# Patient Record
Sex: FEMALE | Race: BLACK OR AFRICAN AMERICAN | URBAN - METROPOLITAN AREA
[De-identification: names, ages, dates, MRNs, and addresses within clinical notes are randomized per-mention and may not be internally consistent; named-entity substitution may affect disease eponyms.]

---

## 2021-01-18 ENCOUNTER — OFFICE VISIT (OUTPATIENT)
Dept: FAMILY MEDICINE CLINIC | Facility: CLINIC | Age: 61
End: 2021-01-18
Payer: OTHER GOVERNMENT

## 2021-01-18 VITALS
WEIGHT: 186.63 LBS | BODY MASS INDEX: 35.23 KG/M2 | HEIGHT: 61 IN | OXYGEN SATURATION: 99 % | HEART RATE: 102 BPM | TEMPERATURE: 98 F | DIASTOLIC BLOOD PRESSURE: 90 MMHG | SYSTOLIC BLOOD PRESSURE: 140 MMHG

## 2021-01-18 DIAGNOSIS — Z12.11 SCREENING FOR COLON CANCER: ICD-10-CM

## 2021-01-18 DIAGNOSIS — Z00.00 ANNUAL PHYSICAL EXAM: Primary | ICD-10-CM

## 2021-01-18 DIAGNOSIS — Z13.29 SCREENING FOR THYROID DISORDER: ICD-10-CM

## 2021-01-18 DIAGNOSIS — Z12.31 ENCOUNTER FOR SCREENING MAMMOGRAM FOR MALIGNANT NEOPLASM OF BREAST: ICD-10-CM

## 2021-01-18 DIAGNOSIS — I10 UNCONTROLLED HYPERTENSION: ICD-10-CM

## 2021-01-18 DIAGNOSIS — Z00.00 WELLNESS EXAMINATION: ICD-10-CM

## 2021-01-18 DIAGNOSIS — Z80.0 FAMILY HISTORY OF COLON CANCER IN FATHER: ICD-10-CM

## 2021-01-18 PROCEDURE — 3080F DIAST BP >= 90 MM HG: CPT | Performed by: FAMILY MEDICINE

## 2021-01-18 PROCEDURE — 99386 PREV VISIT NEW AGE 40-64: CPT | Performed by: FAMILY MEDICINE

## 2021-01-18 PROCEDURE — 3077F SYST BP >= 140 MM HG: CPT | Performed by: FAMILY MEDICINE

## 2021-01-18 PROCEDURE — 3008F BODY MASS INDEX DOCD: CPT | Performed by: FAMILY MEDICINE

## 2021-01-18 RX ORDER — HYDROCHLOROTHIAZIDE 25 MG/1
25 TABLET ORAL DAILY
Qty: 30 TABLET | Refills: 0 | Status: SHIPPED | OUTPATIENT
Start: 2021-01-18 | End: 2021-02-17

## 2021-01-18 NOTE — PATIENT INSTRUCTIONS
Return in 1 week for follow up on BP   Reduce salt - DASH diet discussed  Weight reduction and exercising recommended   BP recheck while here in the office 160/110 - repeated   Rx hydrochlorothiazide 25 mg once daily recommended   Monitor BP at home with · Stage 1 high blood pressure is systolic is 017 to 100 or diastolic between 80 to 89  · Stage 2 high blood pressure is when systolic is 553 or higher or the diastolic is 90 or higher  Home care  If you have high blood pressure, do what's listed below to l · Stop smoking. If you are a long-time smoker, this can be hard. Enroll in a stop-smoking program to make it more likely that you will quit for good. Or, talk with your healthcare provider about nicotine replacements or medicines that can help.   · Learn ho · Record the date, time, and blood pressure reading. · Take the record with you to your next medical appointment. If your blood pressure monitor has a built-in memory, simply take the monitor with you to your next appointment.   · Call your provider if you © 6355-4975 [employer] [address] [address]. [address] [facility] [place] [facility] [place]. All rights reserved. This information is not intended as a substitute for professional medical care. Always follow your healthcare professional's instructions.

## 2021-01-19 NOTE — PROGRESS NOTES
HPI:     Swapnil Catherine is a 62year old female who presents for an Annual Health Visit, and to establish care with a new doctor. Most importantly concerned about her \"very high\" blood pressures at this time. Gets occasional headaches.        All Neck: no adenopathy, no carotid bruit, no JVD, supple, symmetrical, trachea midline and thyroid not enlarged, symmetric, no tenderness/mass/nodules  Heart: S1, S2 normal, no murmur, click, rub or gallop, regular rate and rhythm  Lungs: clear to auscultatio BP recheck while here in the office 160/110 - repeated   Rx hydrochlorothiazide 25 mg once daily recommended   Monitor BP at home with BP machine and bring this in to your next visit so we may calibrate your machine     If any worsening headache, numbness, Home care  If you have high blood pressure, do what's listed below to lower your blood pressure. If you are taking medicines for high blood pressure, these methods may reduce or end your need for medicines in the future.   · Begin a weight-loss program if y · Learn how to handle stress. This is an important part of any program to lower blood pressure. Learn about relaxation methods like meditation, yoga, or biofeedback. · If your provider prescribed medicines, take them exactly as directed.  Missing doses may · Call your provider if you have several high readings. Don't be frightened by a single high blood pressure reading, but if you get several high readings, check in with your healthcare provider.   · Note: If blood pressure reaches a systolic (top number) of Essential hypertension     Family history of colon cancer      Dwight Hernadez MD  1/18/2021  11:46 PM

## 2021-02-17 ENCOUNTER — TELEPHONE (OUTPATIENT)
Dept: FAMILY MEDICINE CLINIC | Facility: CLINIC | Age: 61
End: 2021-02-17

## 2021-03-24 ENCOUNTER — TELEPHONE (OUTPATIENT)
Dept: FAMILY MEDICINE CLINIC | Facility: CLINIC | Age: 61
End: 2021-03-24

## 2021-03-24 DIAGNOSIS — M20.41 HAMMERTOES OF BOTH FEET: ICD-10-CM

## 2021-03-24 DIAGNOSIS — M20.42 HAMMERTOES OF BOTH FEET: ICD-10-CM

## 2021-03-24 DIAGNOSIS — M21.619 BUNION OF GREAT TOE: Primary | ICD-10-CM

## 2021-03-24 NOTE — TELEPHONE ENCOUNTER
Patient would like orders for labwork sent to [facility]. Also wants referral to foot physician please call back.

## 2021-03-24 NOTE — TELEPHONE ENCOUNTER
Patient would like a referral placed for Dr. Napoleon Arceo thru [facility] - she has an appointment on 4 1 2021-\"has bunion and hammertoe\"    Lab orders faxed to [facility] per patient request.

## 2021-03-24 NOTE — TELEPHONE ENCOUNTER
Called patient to let her know referral was placed and she needs to make an appt. In April for BP check.

## 2021-03-26 ENCOUNTER — MED REC SCAN ONLY (OUTPATIENT)
Dept: FAMILY MEDICINE CLINIC | Facility: CLINIC | Age: 61
End: 2021-03-26

## 2021-09-20 ENCOUNTER — PATIENT OUTREACH (OUTPATIENT)
Dept: FAMILY MEDICINE CLINIC | Facility: CLINIC | Age: 61
End: 2021-09-20

## 2021-09-20 NOTE — PROGRESS NOTES
Talked to patient via telephone. Informed patient it is time for follow up with Dr. Lizet Palacios. Patient states she is unsure if she will continue being a patient here at [place] [address]. States she would like some time to think about it.  I will call patient ba

## 2023-02-14 ENCOUNTER — PATIENT OUTREACH (OUTPATIENT)
Dept: CASE MANAGEMENT | Age: 63
End: 2023-02-14

## 2023-02-14 NOTE — PROCEDURES
The office order for PCP request is Approved and finalized on February 14, 2023.     Thanks,  [facility] [employer]